# Patient Record
Sex: FEMALE | Race: WHITE | NOT HISPANIC OR LATINO | Employment: FULL TIME | URBAN - NONMETROPOLITAN AREA
[De-identification: names, ages, dates, MRNs, and addresses within clinical notes are randomized per-mention and may not be internally consistent; named-entity substitution may affect disease eponyms.]

---

## 2022-01-28 ENCOUNTER — OFFICE VISIT (OUTPATIENT)
Dept: URGENT CARE | Facility: PHYSICIAN GROUP | Age: 47
End: 2022-01-28
Payer: COMMERCIAL

## 2022-01-28 VITALS
HEART RATE: 103 BPM | TEMPERATURE: 98.3 F | BODY MASS INDEX: 34 KG/M2 | OXYGEN SATURATION: 96 % | WEIGHT: 162 LBS | SYSTOLIC BLOOD PRESSURE: 114 MMHG | DIASTOLIC BLOOD PRESSURE: 80 MMHG | HEIGHT: 58 IN | RESPIRATION RATE: 18 BRPM

## 2022-01-28 DIAGNOSIS — R05.9 COUGH: ICD-10-CM

## 2022-01-28 DIAGNOSIS — J06.9 UPPER RESPIRATORY TRACT INFECTION, UNSPECIFIED TYPE: ICD-10-CM

## 2022-01-28 DIAGNOSIS — J06.9 PROTRACTED URI: ICD-10-CM

## 2022-01-28 DIAGNOSIS — R06.2 WHEEZE: ICD-10-CM

## 2022-01-28 PROBLEM — R47.89 WORD FINDING DIFFICULTY: Status: ACTIVE | Noted: 2019-10-28

## 2022-01-28 PROBLEM — G47.19 EXCESSIVE DAYTIME SLEEPINESS: Status: ACTIVE | Noted: 2022-01-04

## 2022-01-28 PROBLEM — R59.9 LYMPH NODE ENLARGEMENT: Status: ACTIVE | Noted: 2020-09-02

## 2022-01-28 PROBLEM — R51.9 CHRONIC NONINTRACTABLE HEADACHE: Status: ACTIVE | Noted: 2021-07-29

## 2022-01-28 PROBLEM — G89.29 CHRONIC NONINTRACTABLE HEADACHE: Status: ACTIVE | Noted: 2021-07-29

## 2022-01-28 PROBLEM — R79.89 ELEVATED LFTS: Status: ACTIVE | Noted: 2020-01-29

## 2022-01-28 PROBLEM — R13.19 OTHER DYSPHAGIA: Status: ACTIVE | Noted: 2019-10-28

## 2022-01-28 PROCEDURE — 99203 OFFICE O/P NEW LOW 30 MIN: CPT | Performed by: PHYSICIAN ASSISTANT

## 2022-01-28 RX ORDER — LEVOTHYROXINE SODIUM 100 UG/1
TABLET ORAL
COMMUNITY
Start: 2022-01-07

## 2022-01-28 RX ORDER — METHYLPREDNISOLONE 4 MG/1
4 TABLET ORAL SEE ADMIN INSTRUCTIONS
Qty: 21 TABLET | Refills: 0 | Status: SHIPPED | OUTPATIENT
Start: 2022-01-28

## 2022-01-28 RX ORDER — ALBUTEROL SULFATE 90 UG/1
2 AEROSOL, METERED RESPIRATORY (INHALATION)
COMMUNITY
Start: 2021-11-23

## 2022-01-28 RX ORDER — IBUPROFEN 200 MG
400 TABLET ORAL
COMMUNITY

## 2022-01-28 RX ORDER — AZITHROMYCIN 250 MG/1
TABLET, FILM COATED ORAL
Qty: 6 TABLET | Refills: 0 | Status: SHIPPED | OUTPATIENT
Start: 2022-01-28

## 2022-01-28 RX ORDER — ALBUTEROL SULFATE 90 UG/1
AEROSOL, METERED RESPIRATORY (INHALATION)
COMMUNITY
Start: 2022-01-15

## 2022-01-28 RX ORDER — DEXTROMETHORPHAN HYDROBROMIDE AND PROMETHAZINE HYDROCHLORIDE 15; 6.25 MG/5ML; MG/5ML
5 SYRUP ORAL EVERY 4 HOURS PRN
Qty: 120 ML | Refills: 0 | Status: SHIPPED | OUTPATIENT
Start: 2022-01-28

## 2022-01-28 NOTE — PROGRESS NOTES
Chief Complaint   Patient presents with   • Cough     x 5 days   • Wheezing     last night       HISTORY OF PRESENT ILLNESS: Patient is a 46 y.o. female who presents today because she had Covid about a month ago and was feeling better after couple weeks and felt good for about a week and a half or so but then developed cough, wheezing, tight harsh cough.  She has been using albuterol inhaler without improvement.    Patient Active Problem List    Diagnosis Date Noted   • Excessive daytime sleepiness 01/04/2022   • Chronic nonintractable headache 07/29/2021   • Lymph node enlargement 09/02/2020   • Elevated LFTs 01/29/2020   • Other dysphagia 10/28/2019   • Word finding difficulty 10/28/2019   • Scalp psoriasis 11/18/2016   • Lichen sclerosus et atrophicus 09/07/2016   • Hair loss 06/28/2016   • Reactive airway disease 02/12/2015   • Obesity 02/12/2015   • Migraine headache 06/26/2014   • Hypothyroidism 06/26/2014       Allergies:Aspirin and Hydromorphone    Current Outpatient Medications Ordered in Epic   Medication Sig Dispense Refill   • albuterol 108 (90 Base) MCG/ACT Aero Soln inhalation aerosol Inhale 2 Puffs.     • methylPREDNISolone (MEDROL DOSEPAK) 4 MG Tablet Therapy Pack Take 1 Tablet by mouth see administration instructions. 21 Tablet 0   • azithromycin (ZITHROMAX) 250 MG Tab Follow package directions 6 Tablet 0   • promethazine-dextromethorphan (PROMETHAZINE-DM) 6.25-15 MG/5ML syrup Take 5 mL by mouth every four hours as needed for Cough. 120 mL 0   • EUTHYROX 100 MCG Tab TAKE 1 TABLET BY MOUTH ONCE DAILY BEFORE BREAKFAST     • ibuprofen (MOTRIN) 200 MG Tab Take 400 mg by mouth.     • albuterol 108 (90 Base) MCG/ACT Aero Soln inhalation aerosol INHALE 2 PUFFS BY MOUTH EVERY 4 HOURS AS NEEDED FOR WHEEZING AND FOR SHORTNESS OF BREATH     • Multiple Vitamins-Minerals (CENTRUM WOMEN PO) Take  by mouth.       No current Epic-ordered facility-administered medications on file.       History reviewed. No  "pertinent past medical history.    Social History     Tobacco Use   • Smoking status: Never Smoker   • Smokeless tobacco: Never Used   Substance Use Topics   • Alcohol use: Never   • Drug use: Never       No family status information on file.   History reviewed. No pertinent family history.    ROS:  Review of Systems   Constitutional: Negative for fever, chills, weight loss and malaise/fatigue.   HENT: Negative for ear pain, nosebleeds, congestion, sore throat and neck pain.    Eyes: Negative for blurred vision.   Respiratory: Positive for cough, sputum production, shortness of breath and wheezing.    Cardiovascular: Negative for chest pain, palpitations, orthopnea and leg swelling.   Gastrointestinal: Negative for heartburn, nausea, vomiting and abdominal pain.   Genitourinary: Negative for dysuria, urgency and frequency.     Exam:  /80   Pulse (!) 103   Temp 36.8 °C (98.3 °F) (Temporal)   Resp 18   Ht 1.473 m (4' 10\")   Wt 73.5 kg (162 lb)   SpO2 96%   General:  Well nourished, well developed female in NAD  Head:Normocephalic, atraumatic  Eyes: PERRLA, EOM within normal limits, no conjunctival injection, no scleral icterus, visual fields and acuity grossly intact.  Ears: Normal shape and symmetry, no tenderness, no discharge. External canals are without any significant edema or erythema. Tympanic membranes are without any inflammation, no effusion. Gross auditory acuity is intact  Nose: Symmetrical without tenderness, no discharge.  Mouth: reasonable hygiene, no erythema exudates or tonsillar enlargement.  Neck: no masses, range of motion within normal limits, no tracheal deviation. No obvious thyroid enlargement.  Pulmonary: chest is symmetrical with respiration, bronchial bilaterally, inspiratory rales in the left lower lobe, tight expiratory wheezes.    Cardiovascular: regular rate and rhythm without murmurs, rubs, or gallops.  Extremities: no clubbing, cyanosis, or edema.    Please note that this " dictation was created using voice recognition software. I have made every reasonable attempt to correct obvious errors, but I expect that there are errors of grammar and possibly content that I did not discover before finalizing the note.    Assessment/Plan:  1. Upper respiratory tract infection, unspecified type     2. Wheeze     3. Cough  promethazine-dextromethorphan (PROMETHAZINE-DM) 6.25-15 MG/5ML syrup   4. Protracted URI  methylPREDNISolone (MEDROL DOSEPAK) 4 MG Tablet Therapy Pack    azithromycin (ZITHROMAX) 250 MG Tab       Followup with primary care in the next 7-10 days if not significantly improving, return to the urgent care or go to the emergency room sooner for any worsening of symptoms.